# Patient Record
Sex: MALE | Race: WHITE
[De-identification: names, ages, dates, MRNs, and addresses within clinical notes are randomized per-mention and may not be internally consistent; named-entity substitution may affect disease eponyms.]

---

## 2019-07-22 ENCOUNTER — HOSPITAL ENCOUNTER (EMERGENCY)
Dept: HOSPITAL 10 - E/R | Age: 54
Discharge: HOME | End: 2019-07-22
Payer: COMMERCIAL

## 2019-07-22 ENCOUNTER — HOSPITAL ENCOUNTER (EMERGENCY)
Dept: HOSPITAL 91 - E/R | Age: 54
Discharge: HOME | End: 2019-07-22
Payer: COMMERCIAL

## 2019-07-22 VITALS — SYSTOLIC BLOOD PRESSURE: 102 MMHG | DIASTOLIC BLOOD PRESSURE: 75 MMHG | HEART RATE: 76 BPM | RESPIRATION RATE: 18 BRPM

## 2019-07-22 VITALS
WEIGHT: 180.78 LBS | BODY MASS INDEX: 26.78 KG/M2 | HEIGHT: 69 IN | BODY MASS INDEX: 26.78 KG/M2 | HEIGHT: 69 IN | WEIGHT: 180.78 LBS

## 2019-07-22 DIAGNOSIS — R11.0: ICD-10-CM

## 2019-07-22 DIAGNOSIS — R10.31: Primary | ICD-10-CM

## 2019-07-22 LAB
ADD MAN DIFF?: NO
ADD UMIC: YES
ALANINE AMINOTRANSFERASE: 42 IU/L (ref 13–69)
ALBUMIN/GLOBULIN RATIO: 1.3
ALBUMIN: 4.7 G/DL (ref 3.3–4.9)
ALKALINE PHOSPHATASE: 69 IU/L (ref 42–121)
ANION GAP: 10 (ref 5–13)
ASPARTATE AMINO TRANSFERASE: 23 IU/L (ref 15–46)
BASOPHIL #: 0 10^3/UL (ref 0–0.1)
BASOPHILS %: 0.2 % (ref 0–2)
BILIRUBIN,DIRECT: 0 MG/DL (ref 0–0.2)
BILIRUBIN,TOTAL: 1.2 MG/DL (ref 0.2–1.3)
BLOOD UREA NITROGEN: 12 MG/DL (ref 7–20)
CALCIUM: 9.8 MG/DL (ref 8.4–10.2)
CARBON DIOXIDE: 30 MMOL/L (ref 21–31)
CHLORIDE: 102 MMOL/L (ref 97–110)
CREATININE: 1.24 MG/DL (ref 0.61–1.24)
EOSINOPHILS #: 0 10^3/UL (ref 0–0.5)
EOSINOPHILS %: 0 % (ref 0–7)
GLOBULIN: 3.6 G/DL (ref 1.3–3.2)
GLUCOSE: 102 MG/DL (ref 70–220)
HEMATOCRIT: 46.6 % (ref 42–52)
HEMOGLOBIN: 15.5 G/DL (ref 14–18)
IMMATURE GRANS #M: 0.04 10^3/UL (ref 0–0.03)
IMMATURE GRANS % (M): 0.3 % (ref 0–0.43)
LIPASE: 29 U/L (ref 23–300)
LYMPHOCYTES #: 1 10^3/UL (ref 0.8–2.9)
LYMPHOCYTES %: 8.1 % (ref 15–51)
MEAN CORPUSCULAR HEMOGLOBIN: 30.2 PG (ref 29–33)
MEAN CORPUSCULAR HGB CONC: 33.3 G/DL (ref 32–37)
MEAN CORPUSCULAR VOLUME: 90.7 FL (ref 82–101)
MEAN PLATELET VOLUME: 11.3 FL (ref 7.4–10.4)
MONOCYTE #: 0.8 10^3/UL (ref 0.3–0.9)
MONOCYTES %: 6.2 % (ref 0–11)
NEUTROPHIL #: 10.5 10^3/UL (ref 1.6–7.5)
NEUTROPHILS %: 85.2 % (ref 39–77)
NUCLEATED RED BLOOD CELLS #: 0 10^3/UL (ref 0–0)
NUCLEATED RED BLOOD CELLS%: 0 /100WBC (ref 0–0)
PLATELET COUNT: 164 10^3/UL (ref 140–415)
POTASSIUM: 3.9 MMOL/L (ref 3.5–5.1)
RED BLOOD COUNT: 5.14 10^6/UL (ref 4.7–6.1)
RED CELL DISTRIBUTION WIDTH: 12.2 % (ref 11.5–14.5)
SODIUM: 142 MMOL/L (ref 135–144)
TOTAL PROTEIN: 8.3 G/DL (ref 6.1–8.1)
UR ASCORBIC ACID: NEGATIVE MG/DL
UR BILIRUBIN (DIP): NEGATIVE MG/DL
UR BLOOD (DIP): (no result) MG/DL
UR CLARITY: (no result)
UR COLOR: (no result)
UR GLUCOSE (DIP): NEGATIVE MG/DL
UR KETONES (DIP): NEGATIVE MG/DL
UR LEUKOCYTE ESTERASE (DIP): NEGATIVE LEU/UL
UR MUCUS: (no result) /HPF
UR NITRITE (DIP): NEGATIVE MG/DL
UR PH (DIP): 6 (ref 5–9)
UR RBC: 14 /HPF (ref 0–5)
UR SPECIFIC GRAVITY (DIP): 1.03 (ref 1–1.03)
UR TOTAL PROTEIN (DIP): (no result) MG/DL
UR UROBILINOGEN (DIP): (no result) MG/DL
UR WBC: 2 /HPF (ref 0–5)
WHITE BLOOD COUNT: 12.3 10^3/UL (ref 4.8–10.8)

## 2019-07-22 PROCEDURE — 96374 THER/PROPH/DIAG INJ IV PUSH: CPT

## 2019-07-22 PROCEDURE — 36415 COLL VENOUS BLD VENIPUNCTURE: CPT

## 2019-07-22 PROCEDURE — 99285 EMERGENCY DEPT VISIT HI MDM: CPT

## 2019-07-22 PROCEDURE — 81001 URINALYSIS AUTO W/SCOPE: CPT

## 2019-07-22 PROCEDURE — 96361 HYDRATE IV INFUSION ADD-ON: CPT

## 2019-07-22 PROCEDURE — 83690 ASSAY OF LIPASE: CPT

## 2019-07-22 PROCEDURE — 74177 CT ABD & PELVIS W/CONTRAST: CPT

## 2019-07-22 PROCEDURE — 85025 COMPLETE CBC W/AUTO DIFF WBC: CPT

## 2019-07-22 PROCEDURE — 80053 COMPREHEN METABOLIC PANEL: CPT

## 2019-07-22 PROCEDURE — 96375 TX/PRO/DX INJ NEW DRUG ADDON: CPT

## 2019-07-22 RX ADMIN — ONDANSETRON HYDROCHLORIDE 1 MG: 2 INJECTION, SOLUTION INTRAMUSCULAR; INTRAVENOUS at 20:52

## 2019-07-22 RX ADMIN — HYDROMORPHONE HYDROCHLORIDE 1 MG: 1 INJECTION, SOLUTION INTRAMUSCULAR; INTRAVENOUS; SUBCUTANEOUS at 20:52

## 2019-07-22 RX ADMIN — ACETAMINOPHEN 1 MG: 325 TABLET, FILM COATED ORAL at 20:52

## 2019-07-22 RX ADMIN — THIAMINE HYDROCHLORIDE 1 MLS/HR: 100 INJECTION, SOLUTION INTRAMUSCULAR; INTRAVENOUS at 20:51

## 2019-07-22 NOTE — ERD
ER Documentation


Chief Complaint


Chief Complaint





left abdominal pain x 1 day, also c/o nausea





HPI


This is a 54-year-old male who is here for abdominal pain.  He is also had a 


fever with extreme nausea but no vomiting and diarrhea.  The patient was seen at


a urgent care this afternoon and was told to go to the ER.  At the urgent care 


he had a fever of 101.  He has a history of diverticulosis but no 


diverticulitis.  He is complained to me a pain on the right lower quadrant and 


some mild pain in the left lower quadrant





ROS


All systems reviewed and are negative except as per history of present illness.





Medications


Home Meds


Active Scripts


Hydrocodone/Acetaminophen (Norco  Tablet) 1 Each Tablet, 1 TAB PO Q6H PRN 


for PAIN, #7 TAB


   Prov:HORACIOOS,CESARSTOLOS A. DO         19


Metronidazole* (Flagyl*) 500 Mg Tablet, 500 MG PO TID for 7 Days, TAB


   Prov:LEKKOS,APOSTOLOS A. DO         19


Ciprofloxacin Hcl* (Ciprofloxacin Hcl*) 500 Mg Tablet, 500 MG PO BID for 7 Days,


TAB


   Prov:LEKKOS,APOSTOLOS A. DO         19





Allergies


Allergies:  


Coded Allergies:  


     No Known Drug Allergies (Verified  Allergy, Unknown, 19)





PMhx/Soc


History of Surgery:  No


Anesthesia Reaction:  No


Hx Neurological Disorder:  No


Hx Respiratory Disorders:  No


Hx Cardiac Disorders:  No


Hx Psychiatric Problems:  No


Hx Miscellaneous Medical Probl:  Yes (Diverticulosis)


Hx Alcohol Use:  Yes (Socially)


Hx Substance Use:  No


Hx Tobacco Use:  No


Smoking Status:  Never smoker





FmHx


Family History:  No coronary disease





Physical Exam


Vitals





Vital Signs


  Date      Temp   Pulse  Resp  B/P (MAP)   Pulse Ox  O2         O2 Flow    FiO2


Time                                                  Delivery   Rate


   19   98.1     85    18      104/72       100  Room Air


     22:53                            (83)


   19  100.6


     20:52


   19  100.6    100    18      108/68        98


     19:04                            (81)





Physical Exam


 Const:      Well-developed, well-nourished


 Head:        Atraumatic, normocephalic


 Eyes:       Normal Conjunctiva, PERRLA, EOMI, normal sclera, no nystagmus


 ENT:         Normal External Ears, Nose and Mouth, moist mucus membranes.


 Neck:        Full range of motion.  No meningismus, no lymphadenopathy.


 Resp:         Clear to auscultation bilaterally, no wheezing, rhonchi, rales


 Cardio:       Regular rate and rhythm, no murmurs, S1 S2 present


 Abd:         Soft,, Moderate right lower quadrant tenderness minimal left 


lower, negative Rovsing non distended. Normal bowel sounds, no guarding or 


rebound, no pulsitile abdominal masses or bruits


 Skin:         No petechiae or rashes, no ecchymosis , no maculopapular rash


 Back:        No midline or flank tenderness


 Ext:          No cyanosis, or edema, FROM x 4, normal inspection, ne


urovascularly intact x 4


 Neur:        Awake and alert, STR 5/5 x 4, sensation intact x 4, no focal fi


ndings, cerebellum intact


 Psych:        Normal Mood and Affect


Result Diagram:  


19





Results 24 hrs





Laboratory Tests


              Test
                                  19
20:45


              White Blood Count                      12.3 10^3/ul


              Red Blood Count                        5.14 10^6/ul


              Hemoglobin                                15.5 g/dl


              Hematocrit                                   46.6 %


              Mean Corpuscular Volume                     90.7 fl


              Mean Corpuscular Hemoglobin                 30.2 pg


              Mean Corpuscular Hemoglobin
Concent      33.3 g/dl 



              Red Cell Distribution Width                  12.2 %


              Platelet Count                          164 10^3/UL


              Mean Platelet Volume                        11.3 fl


              Immature Granulocytes %                     0.300 %


              Neutrophils %                                85.2 %


              Lymphocytes %                                 8.1 %


              Monocytes %                                   6.2 %


              Eosinophils %                                 0.0 %


              Basophils %                                   0.2 %


              Nucleated Red Blood Cells %             0.0 /100WBC


              Immature Granulocytes #               0.040 10^3/ul


              Neutrophils #                          10.5 10^3/ul


              Lymphocytes #                           1.0 10^3/ul


              Monocytes #                             0.8 10^3/ul


              Eosinophils #                           0.0 10^3/ul


              Basophils #                             0.0 10^3/ul


              Nucleated Red Blood Cells #             0.0 10^3/ul


              Urine Color                          MARK


              Urine Clarity
                       SLIGHTLY
CLOUDY


              Urine pH                                        6.0


              Urine Specific Gravity                        1.028


              Urine Ketones                        NEGATIVE mg/dL


              Urine Nitrite                        NEGATIVE mg/dL


              Urine Bilirubin                      NEGATIVE mg/dL


              Urine Urobilinogen                         1+ mg/dL


              Urine Leukocyte Esterase
            NEGATIVE
Michael/ul


              Urine Microscopic RBC                       14 /HPF


              Urine Microscopic WBC                        2 /HPF


              Urine Mucus                          MANY /HPF


              Urine Hemoglobin                           2+ mg/dL


              Urine Glucose                        NEGATIVE mg/dL


              Urine Total Protein                        1+ mg/dl


              Sodium Level                             142 mmol/L


              Potassium Level                          3.9 mmol/L


              Chloride Level                           102 mmol/L


              Carbon Dioxide Level                      30 mmol/L


              Anion Gap                                        10


              Blood Urea Nitrogen                        12 mg/dl


              Creatinine                               1.24 mg/dl


              Est Glomerular Filtrat Rate
mL/min   > 60 mL/min 



              Glucose Level                             102 mg/dl


              Calcium Level                             9.8 mg/dl


              Total Bilirubin                           1.2 mg/dl


              Direct Bilirubin                         0.00 mg/dl


              Indirect Bilirubin                        1.2 mg/dl


              Aspartate Amino Transf
(AST/SGOT)          23 IU/L 



              Alanine Aminotransferase
(ALT/SGPT)        42 IU/L 



              Alkaline Phosphatase                        69 IU/L


              Total Protein                              8.3 g/dl


              Albumin                                    4.7 g/dl


              Globulin                                  3.60 g/dl


              Albumin/Globulin Ratio                         1.30


              Lipase                                       29 U/L





Current Medications


 Medications
   Dose
          Sig/Gomez
       Start Time
   Status  Last


 (Trade)       Ordered        Route
 PRN     Stop Time              Admin
Dose


                              Reason                                Admin


 Sodium         1,000 ml @ 
   Q1H STAT
      19       DC           19


Chloride       1,000 mls/hr   IV
            20:26
                       20:51



                                             19 21:25


                1 mg           ONCE  STAT
    19       DC           19


Hydromorphone                 IV
            20:26
                       20:52



HCl
                                         19 20:28


(Dilaudid)


 Ondansetron    4 mg           ONCE  STAT
    19       DC           19


HCl
  (Zofran                 IV
            20:26
                       20:52



Inj)                                         19 20:28


                650 mg         ONCE  ONCE
    19       DC           19


Acetaminophen                 PO
            20:30
                       20:52




  (Tylenol                                  19 20:31


Tab)








Procedures/MDM


Patient's labs are relatively unremarkable.





There is a mild white blood count elevation of 12.3.





[place] [id number]


                        Radiology Main Line: [phone]





                            DIAGNOSTIC IMAGING REPORT





Patient: THOMAS SMITH   : 1965   Age: 54  Sex: M                 


      


       MR #:    [id number]   Acct #:   [id number]    DOS: 19


Ordering MD: KEON GUTIERREZ DO   Location:  E/R   Room/Bed:                


                           


                                        


PROCEDURE:   CT abdomen and pelvis with contrast. 


 


CLINICAL INDICATION:   Abdominal pain.  


 


TECHNIQUE:   CT scan of the abdomen and pelvis with contrast was performed.  


Coronal and sagittal images were also reformatted.  90 cc Isovue -300 


intravenous contrast was administered without complication. DICOM images are 


available. One or more of the following dose reduction techniques were used: 


Automated exposure control, adjustment of the mA and/or kV according to patient 


size, use of iterative reconstruction technique.


 Total exam CTDIvol = 10.70 mGy and DLP = 733.24 mGy-cm.


 


COMPARISON:   None. 


 


FINDINGS:


 


Visualized lower thorax: Dependent bibasilar subsegmental atelectasis is 


present.  There is no evidence for pleural effusion.


 


Liver, gallbladder, pancreas and spleen:  Hepatomegaly hepatic steatosis is 


present with normal liver contour.  There is no evidence for liver mass or 


ductal dilatation.  The gallbladder is unremarkable.  No common bile duct 


dilatation is evident.  The pancreas is normal.  Splenomegaly of 15 cm is 


present.


 


Adrenal glands and genitourinary system:  The adrenal glands are normal 


bilaterally.  The kidneys are normal in size, contour and attenuation with no 


evidence for masses, calculi or hydronephrosis.  Symmetric enhancement of the 


kidneys is present without evidence of pyelonephritis The ureters are 


unremarkable.  The urinary bladder shows no abnormality.  Prostate gland is 


within limits of normal for size. The scrotum shows no gross abnormality.  


 


Gastrointestinal system:  The stomach, small bowel and large intestine are 


normal in caliber.  There is no evidence of obstruction, ileus or inflammation. 


The appendix and surrounding fat are normal. Extensive diverticular disease is 


seen throughout the entire colon. However, there is no evidence for acute 


diverticulitis. No colonic wall thickening to suggest colitis is demonstrated.


 


Peritoneum, retroperitoneum, vessels and lymph nodes:  The abdominal aorta is 


normal in caliber.  There is no evidence for atherosclerotic calcification.  


Inferior vena cava is normal in caliber.  There is no evidence for adenopathy.  


The peritoneal cavity is normal with no evidence for ascites. There is no 


evidence of pneumoperitoneum.


 


Osseous structures and musculoskeletal system:  There is no evidence for acute 


osseous abnormality or muscular pathology.  No subcutaneous abnormalities are 


present.


 


RPTAT:HJJR


 


IMPRESSION:


 


1. Extensive diverticulosis throughout the entire colon without evidence for 


diverticulitis or acute intra-abdominal abnormality.


2.  Hepatosplenomegaly and hepatic steatosis.


3.  Dependent bibasilar subsegmental atelectasis of the lung bases.


_____________________________________________ 


Kristofer Sandoval, Physician           Date    Time 


Electronically viewed and signed by Kristofer Sandoval Physician on 2019 23:25 


 


D:  2019 23:25  T:  2019 23:25


JR/





CC: KEON GUTIERREZ DO





[phone]














Patient has no evidence of appendicitis there is extensive diverticulosis 


however he may have some microscopic diverticulitis causing his symptoms.  We 


will treat with some Cipro Flagyl and pain medication





Departure


Diagnosis:  


   Primary Impression:  


   Abdominal pain


   Abdominal location:  right lower quadrant  Qualified Codes:  R10.31 - Right 


   lower quadrant pain


Condition:  Stable











KEON GUTIERREZ DO         2019 21:05